# Patient Record
Sex: MALE | Race: WHITE | NOT HISPANIC OR LATINO
[De-identification: names, ages, dates, MRNs, and addresses within clinical notes are randomized per-mention and may not be internally consistent; named-entity substitution may affect disease eponyms.]

---

## 2017-01-11 ENCOUNTER — APPOINTMENT (OUTPATIENT)
Dept: SURGERY | Facility: CLINIC | Age: 62
End: 2017-01-11

## 2017-01-11 VITALS
SYSTOLIC BLOOD PRESSURE: 110 MMHG | RESPIRATION RATE: 15 BRPM | OXYGEN SATURATION: 95 % | TEMPERATURE: 98.6 F | DIASTOLIC BLOOD PRESSURE: 74 MMHG | HEART RATE: 81 BPM

## 2017-01-11 DIAGNOSIS — Z80.42 FAMILY HISTORY OF MALIGNANT NEOPLASM OF PROSTATE: ICD-10-CM

## 2017-01-11 DIAGNOSIS — G89.29 LOW BACK PAIN: ICD-10-CM

## 2017-01-11 DIAGNOSIS — Z87.39 PERSONAL HISTORY OF OTHER DISEASES OF THE MUSCULOSKELETAL SYSTEM AND CONNECTIVE TISSUE: ICD-10-CM

## 2017-01-11 DIAGNOSIS — M54.5 LOW BACK PAIN: ICD-10-CM

## 2017-01-11 DIAGNOSIS — Z80.1 FAMILY HISTORY OF MALIGNANT NEOPLASM OF TRACHEA, BRONCHUS AND LUNG: ICD-10-CM

## 2017-01-11 DIAGNOSIS — Z98.1 ARTHRODESIS STATUS: ICD-10-CM

## 2017-01-11 DIAGNOSIS — Z78.9 OTHER SPECIFIED HEALTH STATUS: ICD-10-CM

## 2017-01-23 ENCOUNTER — RESULT REVIEW (OUTPATIENT)
Age: 62
End: 2017-01-23

## 2017-01-24 ENCOUNTER — APPOINTMENT (OUTPATIENT)
Dept: SURGERY | Facility: HOSPITAL | Age: 62
End: 2017-01-24

## 2017-01-24 ENCOUNTER — OUTPATIENT (OUTPATIENT)
Dept: OUTPATIENT SERVICES | Facility: HOSPITAL | Age: 62
LOS: 1 days | End: 2017-01-24
Payer: COMMERCIAL

## 2017-01-24 DIAGNOSIS — Z12.11 ENCOUNTER FOR SCREENING FOR MALIGNANT NEOPLASM OF COLON: ICD-10-CM

## 2017-01-24 PROCEDURE — 45380 COLONOSCOPY AND BIOPSY: CPT | Mod: XS,PT

## 2017-01-24 PROCEDURE — 88305 TISSUE EXAM BY PATHOLOGIST: CPT

## 2017-01-24 PROCEDURE — 45385 COLONOSCOPY W/LESION REMOVAL: CPT | Mod: PT

## 2017-01-24 PROCEDURE — 88305 TISSUE EXAM BY PATHOLOGIST: CPT | Mod: 26

## 2017-01-25 LAB — SURGICAL PATHOLOGY STUDY: SIGNIFICANT CHANGE UP

## 2018-01-17 ENCOUNTER — TRANSCRIPTION ENCOUNTER (OUTPATIENT)
Age: 63
End: 2018-01-17

## 2018-01-17 ENCOUNTER — OUTPATIENT (OUTPATIENT)
Dept: OUTPATIENT SERVICES | Facility: HOSPITAL | Age: 63
LOS: 1 days | End: 2018-01-17
Payer: COMMERCIAL

## 2018-01-17 VITALS
OXYGEN SATURATION: 99 % | DIASTOLIC BLOOD PRESSURE: 77 MMHG | WEIGHT: 195.77 LBS | TEMPERATURE: 99 F | HEIGHT: 68 IN | RESPIRATION RATE: 14 BRPM | SYSTOLIC BLOOD PRESSURE: 111 MMHG | HEART RATE: 61 BPM

## 2018-01-17 VITALS
RESPIRATION RATE: 17 BRPM | HEART RATE: 63 BPM | SYSTOLIC BLOOD PRESSURE: 116 MMHG | OXYGEN SATURATION: 100 % | DIASTOLIC BLOOD PRESSURE: 77 MMHG

## 2018-01-17 DIAGNOSIS — Z98.1 ARTHRODESIS STATUS: Chronic | ICD-10-CM

## 2018-01-17 DIAGNOSIS — H25.811 COMBINED FORMS OF AGE-RELATED CATARACT, RIGHT EYE: ICD-10-CM

## 2018-01-17 PROCEDURE — 66984 XCAPSL CTRC RMVL W/O ECP: CPT | Mod: RT

## 2018-01-17 PROCEDURE — C1780: CPT

## 2020-01-08 PROBLEM — M54.9 DORSALGIA, UNSPECIFIED: Chronic | Status: ACTIVE | Noted: 2018-01-17

## 2020-01-23 DIAGNOSIS — Z86.010 PERSONAL HISTORY OF COLONIC POLYPS: ICD-10-CM

## 2020-01-23 DIAGNOSIS — D12.6 BENIGN NEOPLASM OF COLON, UNSPECIFIED: ICD-10-CM

## 2020-01-23 DIAGNOSIS — K63.5 POLYP OF COLON: ICD-10-CM

## 2020-01-23 RX ORDER — IBUPROFEN 600 MG/1
600 TABLET, FILM COATED ORAL
Refills: 0 | Status: ACTIVE | COMMUNITY
Start: 1900-01-01 | End: 1900-01-01

## 2020-01-23 RX ORDER — MELOXICAM 7.5 MG/5ML
7.5 SUSPENSION ORAL
Refills: 0 | Status: DISCONTINUED | COMMUNITY
End: 2020-01-23

## 2020-01-23 RX ORDER — TURM/GING/BOS/YUC/WIL/CHAM/HOR 100-100 MG
TABLET ORAL
Refills: 0 | Status: ACTIVE | COMMUNITY

## 2020-01-23 RX ORDER — DICLOFENAC SODIUM 75 MG/1
75 TABLET, DELAYED RELEASE ORAL
Refills: 0 | Status: DISCONTINUED | COMMUNITY
End: 2020-01-23

## 2020-05-19 ENCOUNTER — APPOINTMENT (OUTPATIENT)
Dept: SURGERY | Facility: CLINIC | Age: 65
End: 2020-05-19

## 2021-10-28 DIAGNOSIS — Z91.89 OTHER SPECIFIED PERSONAL RISK FACTORS, NOT ELSEWHERE CLASSIFIED: ICD-10-CM

## 2021-10-29 LAB — SARS-COV-2 N GENE NPH QL NAA+PROBE: NOT DETECTED

## 2022-05-23 ENCOUNTER — RX RENEWAL (OUTPATIENT)
Age: 67
End: 2022-05-23

## 2024-01-10 DIAGNOSIS — Z12.11 ENCOUNTER FOR SCREENING FOR MALIGNANT NEOPLASM OF COLON: ICD-10-CM

## 2024-01-10 RX ORDER — SODIUM SULFATE, POTASSIUM SULFATE AND MAGNESIUM SULFATE 1.6; 3.13; 17.5 G/177ML; G/177ML; G/177ML
17.5-3.13-1.6 SOLUTION ORAL
Qty: 1 | Refills: 0 | Status: ACTIVE | COMMUNITY
Start: 2024-01-10 | End: 1900-01-01

## 2024-01-10 RX ORDER — SODIUM PICOSULFATE, MAGNESIUM OXIDE, AND ANHYDROUS CITRIC ACID 12; 3.5; 1 G/175ML; G/175ML; MG/175ML
10-3.5-12 MG-GM LIQUID ORAL
Qty: 1 | Refills: 0 | Status: ACTIVE | COMMUNITY
Start: 2024-01-10 | End: 1900-01-01

## 2024-01-16 ENCOUNTER — APPOINTMENT (OUTPATIENT)
Dept: SURGERY | Facility: CLINIC | Age: 69
End: 2024-01-16
Payer: COMMERCIAL

## 2024-01-16 ENCOUNTER — LABORATORY RESULT (OUTPATIENT)
Age: 69
End: 2024-01-16

## 2024-01-16 PROCEDURE — 45380 COLONOSCOPY AND BIOPSY: CPT

## 2024-01-29 ENCOUNTER — APPOINTMENT (OUTPATIENT)
Dept: ORTHOPEDIC SURGERY | Facility: CLINIC | Age: 69
End: 2024-01-29
Payer: COMMERCIAL

## 2024-01-29 DIAGNOSIS — Z00.00 ENCOUNTER FOR GENERAL ADULT MEDICAL EXAMINATION W/OUT ABNORMAL FINDINGS: ICD-10-CM

## 2024-01-29 DIAGNOSIS — M75.41 IMPINGEMENT SYNDROME OF RIGHT SHOULDER: ICD-10-CM

## 2024-01-29 PROCEDURE — 73010 X-RAY EXAM OF SHOULDER BLADE: CPT | Mod: RT

## 2024-01-29 PROCEDURE — 99204 OFFICE O/P NEW MOD 45 MIN: CPT

## 2024-01-29 PROCEDURE — 73030 X-RAY EXAM OF SHOULDER: CPT | Mod: RT

## 2024-01-29 PROCEDURE — 72040 X-RAY EXAM NECK SPINE 2-3 VW: CPT

## 2024-01-29 RX ORDER — METHYLPREDNISOLONE 4 MG/1
4 TABLET ORAL
Qty: 1 | Refills: 0 | Status: ACTIVE | COMMUNITY
Start: 2024-01-29 | End: 1900-01-01

## 2024-01-29 NOTE — ASSESSMENT
[FreeTextEntry1] : EXACERBATION OF CHRONIC CERVICAL DDD WITH RIGHT SIDED RADICULITIS ATRAUMATIC NECK AND SHOULDER PAIN X 3 MONTHS  STABLE EXAM AND SX. NO FOCAL DEFICITS  XR REVEAL C4-5 SPONDY AND DISC SPACE NARROWING MULITLEVEL SHOULDER XR UNREMARKABLE. GOOD STRENGTH AND ROM ON EXAM.  ADVISED MDP RX. DISCUSSED R/B/A  ADVISED TRIAL OF PT FOR NECK AND SHOULDER.  RTC 6 WEEKS, CONSIDER MRI AND PAIN MANAGEMENT IF PAIN PERSISTS.    The patient's current medication management of their orthopedic diagnosis was reviewed today:(1) We discussed a comprehensive treatment plan that included pharmaceutical management involving the use of prescription medications. (2) There is a moderate risk of morbidity with further treatment, especially from use of prescription strength medications and possible side effects of these medications which include upset stomach with oral medications, skin reactions to topical medications and cardiac/renal/diabetes issues with long term use. (3) I recommended that the patient follow-up with their medical physician to discuss any significant specific potential issues with long term medication use such as interactions with current medications or with exacerbation of underlying medical comorbidities. (4) The benefits and risks associated with use of injectable, oral or topical, prescription and over the counter anti-inflammatory medications were discussed with the patient. The patient voiced understanding of the risks including but not limited to bleeding, stroke, kidney dysfunction, heart disease, and were referred to the black box warning label for further information.

## 2024-01-29 NOTE — HISTORY OF PRESENT ILLNESS
[Dull/Aching] : dull/aching [de-identified] : 1/24/24: 69yo M here for right shoulder/scap pain x 3 months. Reports radiation of pain into biceps. No injury. Tried motrin without relief. Reports intermittent n/t.  [] : no [FreeTextEntry5] : no injury

## 2024-01-29 NOTE — IMAGING
[Disc space narrowing] : Disc space narrowing [Right] : right shoulder [There are no fractures, subluxations or dislocations. No significant abnormalities are seen] : There are no fractures, subluxations or dislocations. No significant abnormalities are seen [de-identified] : Cervical No swelling, no ecchymosis Trapezial and paracervical tenderness to palpation Diminished range of motion in all planes; pain radiates down arm with neck rotation 5/5 deltoid, biceps, triceps and wrist flexors/extensors Positive spurling, negative garcia Reflexes +2, intact motor distally Altered sensation distally   Right Shoulder No swelling, no ecchymosis, no deformity, no scapular winging. No tenderness to palpation over shoulder, AC joint. Forward flexion: Active 170 degrees Abduction: Active 170 degrees External rotation (with shoulder abducted): Active 70 degrees  Internal rotation (with shoulder abducted): Active 60 degreess 5/5 supraspinatus, 5/5 infraspinatus and 5/5 subscapularis. Negative Bear test, positive impingement sign, negative Pang test. Speeds and Yergason negative Motor and sensory intact distally

## 2024-02-12 ENCOUNTER — APPOINTMENT (OUTPATIENT)
Dept: ORTHOPEDIC SURGERY | Facility: CLINIC | Age: 69
End: 2024-02-12
Payer: COMMERCIAL

## 2024-02-12 DIAGNOSIS — M50.90 CERVICAL DISC DISORDER, UNSPECIFIED, UNSPECIFIED CERVICAL REGION: ICD-10-CM

## 2024-02-12 DIAGNOSIS — M43.12 SPONDYLOLISTHESIS, CERVICAL REGION: ICD-10-CM

## 2024-02-12 PROCEDURE — 99213 OFFICE O/P EST LOW 20 MIN: CPT

## 2024-02-12 NOTE — ASSESSMENT
[FreeTextEntry1] : Improved but symptoms persist despite the Medrol Dosepak and physical therapy.  The clinical presentation suggest a right-sided C6, C7 radiculitis.  Will get MRI of the cervical spine given the narrowing and the spondylolisthesis noted on x-ray and the persistent symptoms.  He will follow-up with our pain management team to discuss neck steps

## 2024-02-12 NOTE — HISTORY OF PRESENT ILLNESS
[Gradual] : gradual [5] : 5 [4] : 4 [Dull/Aching] : dull/aching [Localized] : localized [Tightness] : tightness [Intermittent] : intermittent [Household chores] : household chores [Rest] : rest [Physical therapy] : physical therapy [de-identified] : 1/24/24: 69yo M here for right shoulder/scap pain x 3 months. Reports radiation of pain into biceps. No injury. Tried motrin without relief. Reports intermittent n/t.   02/12/24  follow up right shoulder doing pt  [] : no [FreeTextEntry1] : right shoulder  [FreeTextEntry5] : no injury [de-identified] : with activity  [de-identified] : pt medrol dose pack 3

## 2024-02-12 NOTE — IMAGING
[Disc space narrowing] : Disc space narrowing [Right] : right shoulder [There are no fractures, subluxations or dislocations. No significant abnormalities are seen] : There are no fractures, subluxations or dislocations. No significant abnormalities are seen [de-identified] : Cervical No swelling, no ecchymosis Trapezial and paracervical tenderness to palpation Diminished range of motion in all planes; pain radiates down arm with neck rotation 5/5 deltoid, biceps, triceps and wrist flexors/extensors Positive spurling, negative garcia Reflexes +2, intact motor distally Altered sensation distally

## 2024-02-14 ENCOUNTER — APPOINTMENT (OUTPATIENT)
Dept: MRI IMAGING | Facility: CLINIC | Age: 69
End: 2024-02-14
Payer: COMMERCIAL

## 2024-02-14 PROCEDURE — 72141 MRI NECK SPINE W/O DYE: CPT

## 2024-02-23 ENCOUNTER — APPOINTMENT (OUTPATIENT)
Dept: ORTHOPEDIC SURGERY | Facility: CLINIC | Age: 69
End: 2024-02-23

## 2024-03-06 ENCOUNTER — APPOINTMENT (OUTPATIENT)
Dept: PAIN MANAGEMENT | Facility: CLINIC | Age: 69
End: 2024-03-06
Payer: COMMERCIAL

## 2024-03-06 VITALS — WEIGHT: 185 LBS | BODY MASS INDEX: 28.04 KG/M2 | HEIGHT: 68 IN

## 2024-03-06 PROCEDURE — 99204 OFFICE O/P NEW MOD 45 MIN: CPT

## 2024-03-06 NOTE — ASSESSMENT
[FreeTextEntry1] : After discussing various treatment options with the patient including but not limited to oral medications, physical therapy, exercise, modalities as well as interventional spinal injections, we have decided with the following plan:  1) Intervention Injection Therapy: I personally reviewed the MRI/CT scan images and agree with the radiologist's report. The radiological findings were discussed with the patient. The risks, benefits, contents and alternatives to injection were explained in full to the patient. Risks outlined include but are not limited to infection,sepsis, bleeding, post-dural puncture headache, nerve damage, temporary increase in pain, syncopal episode, failure to resolve symptoms, allergic reaction, symptom recurrence, and elevation of blood sugar in diabetics. Cortisone may cause immunosuppression. Patient understands the risks. All questions were answered. After discussion of options, patient requested an injection. Information regarding the injection was given to the patient. Which medications to stop prior to the injection was explained to the patient as well.  Follow up in 1-2 weeks post injection for re-evaluation.  Continue Home exercises, stretching, activity modification, physical therapy, and conservative care.  Patient is presenting with acute/sub-acute radicular pain with impairment in ADLs and functionality.  The pain has not responded sufficiently to  conservative care including nsaid therapy and/or physical therapy.  There is no bleeding tendency, unstable medical condition, or systemic infection. The purpose of the spinal injections is to facilitate active therapy by providing short term relief through reduction of pain and inflammation.   Injections, by themselves, are not likely to provide long-term relief. Rather, active rehabilitation with modified work achieves long-term relief by increasing active ROM, strength and stability.   C7-T1 Cervical Epidural Steroid Injection under fluoroscopic guidance with image. Of note, the C7-T1 level has been determined to be the safest level to inject in the cervical spine as the epidural space is the largest. Despite pathology at different levels, studies have shown that the medication will spread up to the most cranial level, despite the level of injection.

## 2024-03-06 NOTE — HISTORY OF PRESENT ILLNESS
[FreeTextEntry1] : 03/05/2024 : Patient presents for initial evaluation. Patient has been having pain for about 6 months, his pain is in the right shoulder with radiation down the right arm stopping at the elbow, he had a L4-5 lumbar fusion done 7-8 years ago. His pain is exacerbated with excessive neck movement. Patient takes Tylenol PRN for pain management, has taken an oral steroid with meaningful benefit.  Walks 4 miles per day. Had metatarsal fracture and is now in a boot.   Subjective Weakness: No   Numbness/Tingling: No   Bladder/Bowel dysfunction: No   Treatments Tried:      Attempted modalities for current pain complaint:  See above:    Medications: Tylenol Injections: No Previous Spine Surgery: L4-5 Spinal Fusion  Imaging:  MRI Cervical Spine - 2/14/24 [facility]  Straightening of the cervical lordosis slight anterior listhesis at C4-5 and multilevel degenerative disc disease with disc herniations in the lower cervical spine impingement upon the left exiting C5 nerve root at C4-5 impingement upon the cord asymmetric on the left and left greater than right exiting C6 nerve root impingement at C5-6 and encroachment upon the cord with the right greater than left exiting C7 nerve root impingement at C6-7 no acute fracture or cord compression [Right Arm] : right arm [3] : 3 [2] : 2 [] : yes [Household chores] : household chores [FreeTextEntry9] : HEP [FreeTextEntry7] : Right shoulder/ar, [de-identified] : body adjustment

## 2024-03-19 ENCOUNTER — APPOINTMENT (OUTPATIENT)
Dept: PAIN MANAGEMENT | Facility: CLINIC | Age: 69
End: 2024-03-19
Payer: COMMERCIAL

## 2024-03-19 PROCEDURE — 62321 NJX INTERLAMINAR CRV/THRC: CPT

## 2024-03-19 NOTE — PROCEDURE
[FreeTextEntry3] : Date of Service: 03/19/2024   Account: [id number]   Patient: JYOTSNA MANNING   YOB: 1955   Age: 68 year     Surgeon:                                                      Kyra Mohan M.D.   Pre-Operative Diagnosis:                          Cervical Radiculopathy (M54.12)   Post Operative Diagnosis:                        Cervical Radiculopathy (M54.12)   Procedure:                                                  Interlaminar cervical epidural steroid injection (C7-T1) under fluoroscopic guidance   Anesthesia:                                                 None     This procedure was carried out using fluoroscopic guidance.  The risks and benefits of the procedure were discussed extensively with the patient.  The consent of the patient was obtained and the following procedure was performed.   The patient was placed in the prone position using a thoracic and chin support.  The cervical area was prepped and draped in a sterile fashion.  The fluoroscope visualized the C7-T1 interspace using slight cephalad-caudad angulation and this area was marked.  Using sterile technique the superficial skin was anesthetized with 1% Lidocaine without epinephrine.  A 18 gauge Tuohoy needle was advanced under fluoroscopy using [id number]-[id number] technique until ligament was engaged.  The stilette was then removed and a column of preservative free normal saline flushed throught the tuohoy needle and left with a concave fluid level above the butterfly portion of the tuohoy needle.  The needle was then advanced under fluoroscopic guidance until the column of saline disappeared.  Lateral view confirmed final needle tip placement in the epidural space.  After negative aspiration for heme and CSF, 1 cc of Omnipaque confirmed good cervical epiduragram.   Cervical epidurogram showed no evidence of intrathecal or intravascular flow, and good bilateral epidural flow from C3 to T2 levels.  An injectate of 6 cc of preservative free normal saline plus 12 mg of betamethasone was then injected into the epidural space. The needle was subsequently removed and pressure was applied.  The patient tolerated the procedure well and was instructed to contact me immediately if there were any problems.   Kyra Mohan M.D.

## 2024-04-03 ENCOUNTER — APPOINTMENT (OUTPATIENT)
Dept: PAIN MANAGEMENT | Facility: CLINIC | Age: 69
End: 2024-04-03
Payer: COMMERCIAL

## 2024-04-03 DIAGNOSIS — M54.12 RADICULOPATHY, CERVICAL REGION: ICD-10-CM

## 2024-04-03 PROCEDURE — 99213 OFFICE O/P EST LOW 20 MIN: CPT

## 2024-04-03 NOTE — HISTORY OF PRESENT ILLNESS
[Right Arm] : right arm [2] : 2 [] : yes [Household chores] : household chores [3] : 3 [Injection therapy] : injection therapy [FreeTextEntry1] : 04/03/2024 Follow up today after C7-T1 CARI on 03/19/24. Patient reports a 90% overall reduction of pain s/p epidural, he notices that he will get a transient worsening of his neck pain at the injection site with sitting and laying down in certain positions; he has reduced medication usage, uses Tylenol sparingly.   03/05/2024 : Patient presents for initial evaluation. Patient has been having pain for about 6 months, his pain is in the right shoulder with radiation down the right arm stopping at the elbow, he had a L4-5 lumbar fusion done 7-8 years ago. His pain is exacerbated with excessive neck movement. Patient takes Tylenol PRN for pain management, has taken an oral steroid with meaningful benefit.  Walks 4 miles per day. Had metatarsal fracture and is now in a boot.   Subjective Weakness: No   Numbness/Tingling: No   Bladder/Bowel dysfunction: No   Treatments Tried:      Attempted modalities for current pain complaint:  See above:    Medications: Tylenol Injections: No Previous Spine Surgery: L4-5 Spinal Fusion  Imaging:  MRI Cervical Spine - 2/14/24 [facility]  Straightening of the cervical lordosis slight anterior listhesis at C4-5 and multilevel degenerative disc disease with disc herniations in the lower cervical spine impingement upon the left exiting C5 nerve root at C4-5 impingement upon the cord asymmetric on the left and left greater than right exiting C6 nerve root impingement at C5-6 and encroachment upon the cord with the right greater than left exiting C7 nerve root impingement at C6-7 no acute fracture or cord compression [FreeTextEntry7] : Right shoulder [FreeTextEntry9] : HEP [de-identified] : body adjustment